# Patient Record
Sex: MALE | Race: ASIAN | NOT HISPANIC OR LATINO
[De-identification: names, ages, dates, MRNs, and addresses within clinical notes are randomized per-mention and may not be internally consistent; named-entity substitution may affect disease eponyms.]

---

## 2020-04-03 PROBLEM — Z00.00 ENCOUNTER FOR PREVENTIVE HEALTH EXAMINATION: Status: ACTIVE | Noted: 2020-04-03

## 2020-04-10 ENCOUNTER — APPOINTMENT (OUTPATIENT)
Dept: NEPHROLOGY | Facility: CLINIC | Age: 85
End: 2020-04-10

## 2020-05-21 ENCOUNTER — APPOINTMENT (OUTPATIENT)
Dept: NEUROLOGY | Facility: CLINIC | Age: 85
End: 2020-05-21
Payer: MEDICAID

## 2020-05-21 DIAGNOSIS — Z85.79 PERSONAL HISTORY OF OTHER MALIGNANT NEOPLASMS OF LYMPHOID, HEMATOPOIETIC AND RELATED TISSUES: ICD-10-CM

## 2020-05-21 DIAGNOSIS — Z86.79 PERSONAL HISTORY OF OTHER DISEASES OF THE CIRCULATORY SYSTEM: ICD-10-CM

## 2020-05-21 DIAGNOSIS — Z86.69 PERSONAL HISTORY OF OTHER DISEASES OF THE NERVOUS SYSTEM AND SENSE ORGANS: ICD-10-CM

## 2020-05-21 PROCEDURE — 99203 OFFICE O/P NEW LOW 30 MIN: CPT | Mod: 95

## 2020-05-21 RX ORDER — AMLODIPINE BESYLATE 10 MG/1
10 TABLET ORAL
Refills: 0 | Status: ACTIVE | COMMUNITY

## 2020-05-21 NOTE — CONSULT LETTER
[Dear  ___] : Dear  [unfilled], [Courtesy Letter:] : I had the pleasure of seeing your patient, [unfilled], in my office today. [Consult Closing:] : Thank you very much for allowing me to participate in the care of this patient.  If you have any questions, please do not hesitate to contact me. [Please see my note below.] : Please see my note below. [Sincerely,] : Sincerely, [FreeTextEntry3] : Daron Hutchinson MD.

## 2020-05-21 NOTE — ASSESSMENT
[FreeTextEntry1] : This is a 89-year-old man with a history of lymphoma for which she underwent chemotherapy.\par He now has symptoms of peripheral neuropathy as well as tremor.\par \par I would like to obtain a dopamine spectroscopy scan to assess for Parkinson's disease.\par \par I would also like to obtain EMG and nerve conduction studies to assess the degree of peripheral neuropathy.\par \par I advised the patient and his son to contact me with any questions or concerns.\par I further explained that a followup visit will be arranged once the current global crisis has abated.

## 2020-05-21 NOTE — PHYSICAL EXAM
[FreeTextEntry1] : Examination:\par Examination is limited due to the nature of tele health visit. \par \par The patient is well appearing and in no acute distress. \par \par Neurological Examination: \par Mental status: The patient is awake, alert, and oriented. Attention span seems normal. Recent and remote memory seem intact. \par Cranial nerves: Extraocular motion is full. Face appears symmetric, Shoulder shrug is symmetric, tongue is midline. \par Motor: There is no pronator drift. Fine finger movement is intact. There was no tremor visible via the video connection.\par Sensory: Unable to test.\par Reflexes: Unable to test. \par Cerebellar: No finger nose dysmetria.\par \par Gait is broad based (within the confines of the tele health technology). \par \par There is no edema seen. \par

## 2020-05-21 NOTE — HISTORY OF PRESENT ILLNESS
[Home] : at home, [unfilled] , at the time of the visit. [Verbal consent obtained from patient] : the patient, [unfilled] [Medical Office: ([facility])___] : at the medical office located in  [Family Member] : family member [FreeTextEntry1] : This patient had a tele health visit today. \par \par He has had tremors of both hands.\par He also has noticed decreased sensitivity in the feet.\par He has difficulty with balance.\par The symptoms began in late 2018.\par He has a history of lymphoma and had been on chemotherapy.\par \par He denies neuropathic pain.\par \par

## 2020-06-03 ENCOUNTER — APPOINTMENT (OUTPATIENT)
Dept: NUCLEAR MEDICINE | Facility: CLINIC | Age: 85
End: 2020-06-03
Payer: MEDICAID

## 2020-06-03 ENCOUNTER — OUTPATIENT (OUTPATIENT)
Dept: OUTPATIENT SERVICES | Facility: HOSPITAL | Age: 85
LOS: 1 days | End: 2020-06-03

## 2020-06-03 DIAGNOSIS — R25.1 TREMOR, UNSPECIFIED: ICD-10-CM

## 2020-06-03 PROCEDURE — 78803 RP LOCLZJ TUM SPECT 1 AREA: CPT | Mod: 26

## 2020-06-16 ENCOUNTER — APPOINTMENT (OUTPATIENT)
Dept: NEPHROLOGY | Facility: CLINIC | Age: 85
End: 2020-06-16

## 2020-06-17 ENCOUNTER — APPOINTMENT (OUTPATIENT)
Dept: NEUROLOGY | Facility: CLINIC | Age: 85
End: 2020-06-17

## 2020-06-28 LAB — SARS-COV-2 N GENE NPH QL NAA+PROBE: NOT DETECTED

## 2020-06-29 ENCOUNTER — APPOINTMENT (OUTPATIENT)
Dept: NEUROLOGY | Facility: CLINIC | Age: 85
End: 2020-06-29
Payer: MEDICAID

## 2020-06-29 PROCEDURE — 95886 MUSC TEST DONE W/N TEST COMP: CPT

## 2020-06-29 PROCEDURE — 95910 NRV CNDJ TEST 7-8 STUDIES: CPT

## 2020-08-28 ENCOUNTER — APPOINTMENT (OUTPATIENT)
Dept: NEUROLOGY | Facility: CLINIC | Age: 85
End: 2020-08-28
Payer: MEDICAID

## 2020-08-28 VITALS — DIASTOLIC BLOOD PRESSURE: 80 MMHG | TEMPERATURE: 98.7 F | HEIGHT: 64 IN | SYSTOLIC BLOOD PRESSURE: 140 MMHG

## 2020-08-28 PROCEDURE — 99213 OFFICE O/P EST LOW 20 MIN: CPT

## 2020-08-28 NOTE — HISTORY OF PRESENT ILLNESS
[FreeTextEntry1] : I saw this patient in the office today.\par \par As you recall, he has had tremors of both hands.\par He also has noticed decreased sensitivity in the feet.\par He has difficulty with balance.\par The symptoms began in late 2018.\par He has a history of lymphoma and had been on chemotherapy.\par \par He denies neuropathic pain.\par \par

## 2020-08-28 NOTE — PHYSICAL EXAM
[General Appearance - Alert] : alert [Oriented To Time, Place, And Person] : oriented to person, place, and time [General Appearance - In No Acute Distress] : in no acute distress [Memory Remote] : remote memory was not impaired [Affect] : the affect was normal [Memory Recent] : recent memory was not impaired [Cranial Nerves Optic (II)] : visual acuity intact bilaterally,  visual fields full to confrontation, pupils equal round and reactive to light [Cranial Nerves Oculomotor (III)] : extraocular motion intact [Cranial Nerves Facial (VII)] : face symmetrical [Cranial Nerves Vestibulocochlear (VIII)] : hearing was intact bilaterally [Cranial Nerves Trigeminal (V)] : facial sensation intact symmetrically [Cranial Nerves Accessory (XI - Cranial And Spinal)] : head turning and shoulder shrug symmetric [Cranial Nerves Glossopharyngeal (IX)] : tongue and palate midline [Cranial Nerves Hypoglossal (XII)] : there was no tongue deviation with protrusion [Motor Tone] : muscle tone was normal in all four extremities [Sensation Tactile Decrease] : light touch was intact [Motor Strength] : muscle strength was normal in all four extremities [Sensation Pain / Temperature Decrease] : pain and temperature was intact [Sensation Vibration Decrease] : vibration was intact [Limited Balance] : the patient's balance was impaired [Tremor] : a tremor present [1+] : Patella left 1+ [Optic Disc Abnormality] : the optic disc were normal in size and color [Edema] : there was no peripheral edema [Dysarthria] : no dysarthria [Aphasia] : no dysphasia/aphasia [Coordination - Dysmetria Impaired Finger-to-Nose Bilateral] : not present [Plantar Reflex Right Only] : normal on the right [Plantar Reflex Left Only] : normal on the left [FreeTextEntry8] : Ambulation required a cane.

## 2020-08-28 NOTE — DATA REVIEWED
[de-identified] : Marcia scan was normal.  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

## 2020-08-28 NOTE — ASSESSMENT
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

## 2020-11-27 ENCOUNTER — APPOINTMENT (OUTPATIENT)
Dept: NEPHROLOGY | Facility: CLINIC | Age: 85
End: 2020-11-27
Payer: MEDICAID

## 2020-11-27 VITALS
BODY MASS INDEX: 27.83 KG/M2 | TEMPERATURE: 98.5 F | SYSTOLIC BLOOD PRESSURE: 145 MMHG | HEART RATE: 69 BPM | HEIGHT: 64 IN | DIASTOLIC BLOOD PRESSURE: 74 MMHG | OXYGEN SATURATION: 97 % | RESPIRATION RATE: 14 BRPM | WEIGHT: 163 LBS

## 2020-11-27 DIAGNOSIS — D69.6 THROMBOCYTOPENIA, UNSPECIFIED: ICD-10-CM

## 2020-11-27 DIAGNOSIS — R79.0 ABNORMAL LVL OF BLOOD MINERAL: ICD-10-CM

## 2020-11-27 DIAGNOSIS — N18.4 CHRONIC KIDNEY DISEASE, STAGE 4 (SEVERE): ICD-10-CM

## 2020-11-27 DIAGNOSIS — N18.9 CHRONIC KIDNEY DISEASE, UNSPECIFIED: ICD-10-CM

## 2020-11-27 DIAGNOSIS — D63.1 CHRONIC KIDNEY DISEASE, UNSPECIFIED: ICD-10-CM

## 2020-11-27 PROCEDURE — 36415 COLL VENOUS BLD VENIPUNCTURE: CPT

## 2020-11-27 PROCEDURE — 99215 OFFICE O/P EST HI 40 MIN: CPT | Mod: 25

## 2020-11-27 RX ORDER — BRIMONIDINE TARTRATE 1 MG/ML
SOLUTION/ DROPS OPHTHALMIC
Refills: 0 | Status: DISCONTINUED | COMMUNITY
End: 2020-11-27

## 2020-11-27 RX ORDER — FOLIC ACID 1 MG/1
1 TABLET ORAL
Qty: 30 | Refills: 0 | Status: ACTIVE | COMMUNITY
Start: 2020-11-11

## 2020-11-27 RX ORDER — POLYETHYLENE GLYCOL 3350 17 G/17G
17 POWDER, FOR SOLUTION ORAL
Qty: 510 | Refills: 0 | Status: DISCONTINUED | COMMUNITY
Start: 2020-09-25 | End: 2020-11-27

## 2020-11-27 RX ORDER — LATANOPROST/PF 0.005 %
0.01 DROPS OPHTHALMIC (EYE)
Refills: 0 | Status: DISCONTINUED | COMMUNITY
End: 2020-11-27

## 2020-11-27 NOTE — PHYSICAL EXAM
[General Appearance - Alert] : alert [General Appearance - In No Acute Distress] : in no acute distress [Sclera] : the sclera and conjunctiva were normal [PERRL With Normal Accommodation] : pupils were equal in size, round, and reactive to light [Extraocular Movements] : extraocular movements were intact [Outer Ear] : the ears and nose were normal in appearance [Oropharynx] : the oropharynx was normal [Neck Appearance] : the appearance of the neck was normal [Neck Cervical Mass (___cm)] : no neck mass was observed [Jugular Venous Distention Increased] : there was no jugular-venous distention [Thyroid Diffuse Enlargement] : the thyroid was not enlarged [Thyroid Nodule] : there were no palpable thyroid nodules [Auscultation Breath Sounds / Voice Sounds] : lungs were clear to auscultation bilaterally [Heart Rate And Rhythm] : heart rate was normal and rhythm regular [Heart Sounds] : normal S1 and S2 [Heart Sounds Gallop] : no gallops [Murmurs] : no murmurs [Heart Sounds Pericardial Friction Rub] : no pericardial rub [Full Pulse] : the pedal pulses are present [Edema] : there was no peripheral edema [Bowel Sounds] : normal bowel sounds [Abdomen Soft] : soft [Abdomen Tenderness] : non-tender [Abdomen Mass (___ Cm)] : no abdominal mass palpated [Cervical Lymph Nodes Enlarged Posterior Bilaterally] : posterior cervical [Cervical Lymph Nodes Enlarged Anterior Bilaterally] : anterior cervical [Supraclavicular Lymph Nodes Enlarged Bilaterally] : supraclavicular [Axillary Lymph Nodes Enlarged Bilaterally] : axillary [Femoral Lymph Nodes Enlarged Bilaterally] : femoral [Inguinal Lymph Nodes Enlarged Bilaterally] : inguinal [Skin Color & Pigmentation] : normal skin color and pigmentation [Skin Turgor] : normal skin turgor [] : no rash [Deep Tendon Reflexes (DTR)] : deep tendon reflexes were 2+ and symmetric [Sensation] : the sensory exam was normal to light touch and pinprick [No Focal Deficits] : no focal deficits [Oriented To Time, Place, And Person] : oriented to person, place, and time [Impaired Insight] : insight and judgment were intact [Affect] : the affect was normal

## 2020-11-27 NOTE — ASSESSMENT
[FreeTextEntry1] : DX : Lymphoma- Treated w. Chemotherapy,\par \par CKD - 4, Anemia,\par \par Peripheral Neuropathy, Tremor, \par \par W.U

## 2020-11-27 NOTE — HISTORY OF PRESENT ILLNESS
[FreeTextEntry1] : Neuropathy (355.9) (G62.9)\par \par  Tremor (781.0) (R25.1)  \par \par This is a 89 year-old man with a history of lymphoma for which he  underwent chemotherapy. He now has symptoms of peripheral neuropathy as well as tremor.  Marcia scan was normal.  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

## 2020-11-28 LAB
25(OH)D3 SERPL-MCNC: 29.6 NG/ML
ALBUMIN SERPL ELPH-MCNC: 4.2 G/DL
ANION GAP SERPL CALC-SCNC: 11 MMOL/L
BASOPHILS # BLD AUTO: 0.02 K/UL
BASOPHILS NFR BLD AUTO: 0.3 %
BUN SERPL-MCNC: 44 MG/DL
CALCIUM SERPL-MCNC: 9.4 MG/DL
CALCIUM SERPL-MCNC: 9.4 MG/DL
CHLORIDE SERPL-SCNC: 103 MMOL/L
CO2 SERPL-SCNC: 26 MMOL/L
CREAT SERPL-MCNC: 2.82 MG/DL
EOSINOPHIL # BLD AUTO: 0.17 K/UL
EOSINOPHIL NFR BLD AUTO: 2.8 %
GLUCOSE SERPL-MCNC: 127 MG/DL
HCT VFR BLD CALC: 34.1 %
HGB BLD-MCNC: 10.9 G/DL
IMM GRANULOCYTES NFR BLD AUTO: 0.2 %
LYMPHOCYTES # BLD AUTO: 3.83 K/UL
LYMPHOCYTES NFR BLD AUTO: 62.7 %
MAN DIFF?: NORMAL
MCHC RBC-ENTMCNC: 32 GM/DL
MCHC RBC-ENTMCNC: 33 PG
MCV RBC AUTO: 103.3 FL
MONOCYTES # BLD AUTO: 0.55 K/UL
MONOCYTES NFR BLD AUTO: 9 %
NEUTROPHILS # BLD AUTO: 1.53 K/UL
NEUTROPHILS NFR BLD AUTO: 25 %
PARATHYROID HORMONE INTACT: 62 PG/ML
PHOSPHATE SERPL-MCNC: 3.3 MG/DL
PLATELET # BLD AUTO: 116 K/UL
POTASSIUM SERPL-SCNC: 3.8 MMOL/L
RBC # BLD: 3.3 M/UL
RBC # FLD: 13.6 %
SODIUM SERPL-SCNC: 140 MMOL/L
WBC # FLD AUTO: 6.11 K/UL

## 2020-12-03 LAB
APPEARANCE: CLEAR
BACTERIA: NEGATIVE
BILIRUBIN URINE: NEGATIVE
BLOOD URINE: NEGATIVE
COLOR: NORMAL
CREAT SPEC-SCNC: 107 MG/DL
CREAT/PROT UR: 0.4 RATIO
GLUCOSE QUALITATIVE U: NEGATIVE
HYALINE CASTS: 1 /LPF
KETONES URINE: NEGATIVE
LEUKOCYTE ESTERASE URINE: NEGATIVE
MICROSCOPIC-UA: NORMAL
NITRITE URINE: NEGATIVE
PH URINE: 6
PROT UR-MCNC: 44 MG/DL
PROTEIN URINE: ABNORMAL
RED BLOOD CELLS URINE: 1 /HPF
SPECIFIC GRAVITY URINE: 1.02
SQUAMOUS EPITHELIAL CELLS: 1 /HPF
UROBILINOGEN URINE: NORMAL
WHITE BLOOD CELLS URINE: 1 /HPF

## 2020-12-08 ENCOUNTER — OUTPATIENT (OUTPATIENT)
Dept: OUTPATIENT SERVICES | Facility: HOSPITAL | Age: 85
LOS: 1 days | End: 2020-12-08

## 2020-12-08 ENCOUNTER — APPOINTMENT (OUTPATIENT)
Dept: ULTRASOUND IMAGING | Facility: CLINIC | Age: 85
End: 2020-12-08
Payer: MEDICAID

## 2020-12-08 DIAGNOSIS — N18.4 CHRONIC KIDNEY DISEASE, STAGE 4 (SEVERE): ICD-10-CM

## 2020-12-08 PROCEDURE — 76770 US EXAM ABDO BACK WALL COMP: CPT | Mod: 26

## 2020-12-18 ENCOUNTER — APPOINTMENT (OUTPATIENT)
Dept: NEPHROLOGY | Facility: CLINIC | Age: 85
End: 2020-12-18

## 2021-02-26 ENCOUNTER — APPOINTMENT (OUTPATIENT)
Dept: NEUROLOGY | Facility: CLINIC | Age: 86
End: 2021-02-26
Payer: MEDICAID

## 2021-02-26 VITALS — DIASTOLIC BLOOD PRESSURE: 60 MMHG | TEMPERATURE: 97.4 F | HEIGHT: 64 IN | SYSTOLIC BLOOD PRESSURE: 110 MMHG

## 2021-02-26 PROCEDURE — 99213 OFFICE O/P EST LOW 20 MIN: CPT

## 2021-02-26 NOTE — CONSULT LETTER
[Dear  ___] : Dear  [unfilled], [Courtesy Letter:] : I had the pleasure of seeing your patient, [unfilled], in my office today. [Please see my note below.] : Please see my note below. [Consult Closing:] : Thank you very much for allowing me to participate in the care of this patient.  If you have any questions, please do not hesitate to contact me. [Sincerely,] : Sincerely, [DrDarien  ___] : Dr. GAYLE [FreeTextEntry3] : Daron Hutchinson MD.

## 2021-02-26 NOTE — DATA REVIEWED
[de-identified] : Marcia scan was normal.  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

## 2021-02-26 NOTE — ASSESSMENT
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

## 2021-02-26 NOTE — PHYSICAL EXAM
[General Appearance - Alert] : alert [General Appearance - In No Acute Distress] : in no acute distress [Oriented To Time, Place, And Person] : oriented to person, place, and time [Affect] : the affect was normal [Memory Recent] : recent memory was not impaired [Memory Remote] : remote memory was not impaired [Cranial Nerves Optic (II)] : visual acuity intact bilaterally,  visual fields full to confrontation, pupils equal round and reactive to light [Cranial Nerves Oculomotor (III)] : extraocular motion intact [Cranial Nerves Trigeminal (V)] : facial sensation intact symmetrically [Cranial Nerves Facial (VII)] : face symmetrical [Cranial Nerves Vestibulocochlear (VIII)] : hearing was intact bilaterally [Cranial Nerves Glossopharyngeal (IX)] : tongue and palate midline [Cranial Nerves Accessory (XI - Cranial And Spinal)] : head turning and shoulder shrug symmetric [Cranial Nerves Hypoglossal (XII)] : there was no tongue deviation with protrusion [Motor Tone] : muscle tone was normal in all four extremities [Motor Strength] : muscle strength was normal in all four extremities [Sensation Tactile Decrease] : light touch was intact [Sensation Pain / Temperature Decrease] : pain and temperature was intact [Sensation Vibration Decrease] : vibration was intact [Limited Balance] : the patient's balance was impaired [Tremor] : a tremor present [1+] : Patella left 1+ [Optic Disc Abnormality] : the optic disc were normal in size and color [Edema] : there was no peripheral edema [Dysarthria] : no dysarthria [Aphasia] : no dysphasia/aphasia [Coordination - Dysmetria Impaired Finger-to-Nose Bilateral] : not present [Plantar Reflex Right Only] : normal on the right [Plantar Reflex Left Only] : normal on the left [FreeTextEntry8] : Ambulation required a cane.

## 2021-07-10 ENCOUNTER — EMERGENCY (EMERGENCY)
Facility: HOSPITAL | Age: 86
LOS: 1 days | Discharge: HOME | End: 2021-07-10
Attending: EMERGENCY MEDICINE
Payer: MEDICAID

## 2021-07-10 VITALS
RESPIRATION RATE: 18 BRPM | TEMPERATURE: 99 F | SYSTOLIC BLOOD PRESSURE: 155 MMHG | OXYGEN SATURATION: 98 % | DIASTOLIC BLOOD PRESSURE: 84 MMHG | HEART RATE: 87 BPM | HEIGHT: 65 IN | WEIGHT: 160.28 LBS

## 2021-07-10 VITALS
SYSTOLIC BLOOD PRESSURE: 147 MMHG | TEMPERATURE: 98 F | OXYGEN SATURATION: 98 % | HEART RATE: 82 BPM | DIASTOLIC BLOOD PRESSURE: 74 MMHG | RESPIRATION RATE: 18 BRPM

## 2021-07-10 LAB
ALBUMIN SERPL ELPH-MCNC: 4.2 G/DL — SIGNIFICANT CHANGE UP (ref 3.3–5.2)
ALP SERPL-CCNC: 90 U/L — SIGNIFICANT CHANGE UP (ref 40–120)
ALT FLD-CCNC: 16 U/L — SIGNIFICANT CHANGE UP
ANION GAP SERPL CALC-SCNC: 13 MMOL/L — SIGNIFICANT CHANGE UP (ref 5–17)
APPEARANCE UR: CLEAR — SIGNIFICANT CHANGE UP
AST SERPL-CCNC: 27 U/L — SIGNIFICANT CHANGE UP
BACTERIA # UR AUTO: NEGATIVE — SIGNIFICANT CHANGE UP
BASOPHILS # BLD AUTO: 0.01 K/UL — SIGNIFICANT CHANGE UP (ref 0–0.2)
BASOPHILS NFR BLD AUTO: 0.2 % — SIGNIFICANT CHANGE UP (ref 0–2)
BILIRUB SERPL-MCNC: 0.3 MG/DL — LOW (ref 0.4–2)
BILIRUB UR-MCNC: NEGATIVE — SIGNIFICANT CHANGE UP
BUN SERPL-MCNC: 39.5 MG/DL — HIGH (ref 8–20)
CALCIUM SERPL-MCNC: 9.2 MG/DL — SIGNIFICANT CHANGE UP (ref 8.6–10.2)
CHLORIDE SERPL-SCNC: 100 MMOL/L — SIGNIFICANT CHANGE UP (ref 98–107)
CO2 SERPL-SCNC: 23 MMOL/L — SIGNIFICANT CHANGE UP (ref 22–29)
COLOR SPEC: YELLOW — SIGNIFICANT CHANGE UP
CREAT SERPL-MCNC: 2.86 MG/DL — HIGH (ref 0.5–1.3)
DIFF PNL FLD: ABNORMAL
EOSINOPHIL # BLD AUTO: 0 K/UL — SIGNIFICANT CHANGE UP (ref 0–0.5)
EOSINOPHIL NFR BLD AUTO: 0 % — SIGNIFICANT CHANGE UP (ref 0–6)
EPI CELLS # UR: NEGATIVE — SIGNIFICANT CHANGE UP
GLUCOSE SERPL-MCNC: 141 MG/DL — HIGH (ref 70–99)
GLUCOSE UR QL: NEGATIVE MG/DL — SIGNIFICANT CHANGE UP
HCT VFR BLD CALC: 34.7 % — LOW (ref 39–50)
HGB BLD-MCNC: 11.7 G/DL — LOW (ref 13–17)
IMM GRANULOCYTES NFR BLD AUTO: 0.4 % — SIGNIFICANT CHANGE UP (ref 0–1.5)
KETONES UR-MCNC: NEGATIVE — SIGNIFICANT CHANGE UP
LEUKOCYTE ESTERASE UR-ACNC: NEGATIVE — SIGNIFICANT CHANGE UP
LYMPHOCYTES # BLD AUTO: 0.98 K/UL — LOW (ref 1–3.3)
LYMPHOCYTES # BLD AUTO: 17.4 % — SIGNIFICANT CHANGE UP (ref 13–44)
MCHC RBC-ENTMCNC: 33.7 GM/DL — SIGNIFICANT CHANGE UP (ref 32–36)
MCHC RBC-ENTMCNC: 34.4 PG — HIGH (ref 27–34)
MCV RBC AUTO: 102.1 FL — HIGH (ref 80–100)
MONOCYTES # BLD AUTO: 0.66 K/UL — SIGNIFICANT CHANGE UP (ref 0–0.9)
MONOCYTES NFR BLD AUTO: 11.7 % — SIGNIFICANT CHANGE UP (ref 2–14)
NEUTROPHILS # BLD AUTO: 3.96 K/UL — SIGNIFICANT CHANGE UP (ref 1.8–7.4)
NEUTROPHILS NFR BLD AUTO: 70.3 % — SIGNIFICANT CHANGE UP (ref 43–77)
NITRITE UR-MCNC: NEGATIVE — SIGNIFICANT CHANGE UP
PH UR: 6 — SIGNIFICANT CHANGE UP (ref 5–8)
PLATELET # BLD AUTO: 109 K/UL — LOW (ref 150–400)
POTASSIUM SERPL-MCNC: 4.5 MMOL/L — SIGNIFICANT CHANGE UP (ref 3.5–5.3)
POTASSIUM SERPL-SCNC: 4.5 MMOL/L — SIGNIFICANT CHANGE UP (ref 3.5–5.3)
PROT SERPL-MCNC: 8.3 G/DL — SIGNIFICANT CHANGE UP (ref 6.6–8.7)
PROT UR-MCNC: 30 MG/DL
RBC # BLD: 3.4 M/UL — LOW (ref 4.2–5.8)
RBC # FLD: 12 % — SIGNIFICANT CHANGE UP (ref 10.3–14.5)
RBC CASTS # UR COMP ASSIST: ABNORMAL /HPF (ref 0–4)
SODIUM SERPL-SCNC: 136 MMOL/L — SIGNIFICANT CHANGE UP (ref 135–145)
SP GR SPEC: 1.01 — SIGNIFICANT CHANGE UP (ref 1.01–1.02)
UROBILINOGEN FLD QL: NEGATIVE MG/DL — SIGNIFICANT CHANGE UP
WBC # BLD: 5.63 K/UL — SIGNIFICANT CHANGE UP (ref 3.8–10.5)
WBC # FLD AUTO: 5.63 K/UL — SIGNIFICANT CHANGE UP (ref 3.8–10.5)
WBC UR QL: SIGNIFICANT CHANGE UP

## 2021-07-10 PROCEDURE — 74176 CT ABD & PELVIS W/O CONTRAST: CPT

## 2021-07-10 PROCEDURE — 51702 INSERT TEMP BLADDER CATH: CPT

## 2021-07-10 PROCEDURE — 85025 COMPLETE CBC W/AUTO DIFF WBC: CPT

## 2021-07-10 PROCEDURE — 36415 COLL VENOUS BLD VENIPUNCTURE: CPT

## 2021-07-10 PROCEDURE — 93010 ELECTROCARDIOGRAM REPORT: CPT

## 2021-07-10 PROCEDURE — 93005 ELECTROCARDIOGRAM TRACING: CPT

## 2021-07-10 PROCEDURE — 99285 EMERGENCY DEPT VISIT HI MDM: CPT

## 2021-07-10 PROCEDURE — 87086 URINE CULTURE/COLONY COUNT: CPT

## 2021-07-10 PROCEDURE — 74176 CT ABD & PELVIS W/O CONTRAST: CPT | Mod: 26,MA

## 2021-07-10 PROCEDURE — 99284 EMERGENCY DEPT VISIT MOD MDM: CPT | Mod: 25

## 2021-07-10 PROCEDURE — 80053 COMPREHEN METABOLIC PANEL: CPT

## 2021-07-10 PROCEDURE — 81001 URINALYSIS AUTO W/SCOPE: CPT

## 2021-07-10 RX ORDER — ACETAMINOPHEN 500 MG
650 TABLET ORAL ONCE
Refills: 0 | Status: COMPLETED | OUTPATIENT
Start: 2021-07-10 | End: 2021-07-10

## 2021-07-10 RX ADMIN — Medication 650 MILLIGRAM(S): at 10:06

## 2021-07-10 NOTE — ED ADULT TRIAGE NOTE - CHIEF COMPLAINT QUOTE
pt A&O x4 from home c/o constipation x 6 days and urinary retention since 6pm last night. Pt developed lower abdominal pain this AM. Denies any other symptoms

## 2021-07-10 NOTE — ED PROVIDER NOTE - CLINICAL SUMMARY MEDICAL DECISION MAKING FREE TEXT BOX
Pt is a 89 y/o M c/o urinary retention, will get bladder scan, ct abd/pelv, labs, ua, ekg, rectal temp

## 2021-07-10 NOTE — ED PROVIDER NOTE - PHYSICAL EXAMINATION
General: well appearing, interactive, well nourished, NAD  HEENT: pupils equal and reactive, normal external ears bilaterally   Cardiac: RRR, no MRG appreciated  Resp: lungs clear to auscultation bilaterally, symmetric chest wall rise  Abd: suprapubic firm and tender, nondistended,   : no CVA tenderness  Neuro: Moving all extremities  Skin:  normal color for race

## 2021-07-10 NOTE — ED PROVIDER NOTE - PATIENT PORTAL LINK FT
You can access the FollowMyHealth Patient Portal offered by [facility] by registering at the following website: [email or web address]. By joining [facility]’s FollowMyHealth portal, you will also be able to view your health information using other applications (apps) compatible with our system.

## 2021-07-10 NOTE — ED PROVIDER NOTE - OBJECTIVE STATEMENT
Pt is a 89 y/o M w/PMHx HTN, non-Hodgkin's lymphoma (remission) presents c/o urinary retention.  Per son at bedside, Pt has not urinated since last night around 6 pm, and states that when he tries to urinate he has a burning sensation.  Pt has been taking go-lytely for the past few days due to constipation, and has had issues with constipation previously.  He has been passing gas, but very little stool.  Pt endorses abdominal pain, dysuria, constipation, denies chest pain, shortness of breath, fever/chills.

## 2021-07-10 NOTE — ED PROVIDER NOTE - NS ED ROS FT
CONSTITUTIONAL: No fevers, no chills  Eyes: No vision changes  Cardiovascular: No Chest pain  Respiratory: No SOB  Gastrointestinal: No n/v/d, +constipation, +abd pain  Genitourinary: +dysuria, no hematuria  SKIN: no rashes.  MSK: no weakness, no myalgias, no arthralgias  NEURO: no headache, no weakness, no numbness  PSYCHIATRIC: no SI/HI

## 2021-07-10 NOTE — ED PROVIDER NOTE - CARE PROVIDER_API CALL
Spencer Moise  UROLOGY  [address] [address] [place] [address]  Phone: [phone]  Fax: [phone]  Follow Up Time:     Santos Valverde)  Gastroenterology; Internal Medicine  [address] [place] [address], [place] [address]  Phone: [phone]  Fax: [phone]  Follow Up Time:

## 2021-07-10 NOTE — ED PROVIDER NOTE - CARE PROVIDERS DIRECT ADDRESSES
[email or web address]

## 2021-07-10 NOTE — ED PROVIDER NOTE - ATTENDING CONTRIBUTION TO CARE
I, Maik Reno, personally saw the patient with the resident, and completed the key components of the history and physical exam. I then discussed the management plan with the resident.    89 yo M hx of HTN, ckd, non-hodgkin lymphoma, chronic constipation brought in by son for urinary retention, burning sensation, and constipation. patient is passing gas. no fever. no nausea or vomiting. patient has suprapubic tenderness. bladder scan showed > 490 cc. gutierrez placed. CT abdomen showed sterocolar proctitis. wbc 5.63. creatine 2.86 at baseline. attempted manual disimpaction with small amount of stool. recommend rectal enema. GI and urology follow up.

## 2021-07-11 LAB
CULTURE RESULTS: NO GROWTH — SIGNIFICANT CHANGE UP
SPECIMEN SOURCE: SIGNIFICANT CHANGE UP

## 2021-07-23 ENCOUNTER — APPOINTMENT (OUTPATIENT)
Dept: UROLOGY | Facility: CLINIC | Age: 86
End: 2021-07-23

## 2021-08-27 ENCOUNTER — APPOINTMENT (OUTPATIENT)
Dept: NEUROLOGY | Facility: CLINIC | Age: 86
End: 2021-08-27
Payer: MEDICAID

## 2021-08-27 ENCOUNTER — NON-APPOINTMENT (OUTPATIENT)
Age: 86
End: 2021-08-27

## 2021-08-27 PROCEDURE — 99213 OFFICE O/P EST LOW 20 MIN: CPT

## 2021-08-27 NOTE — DATA REVIEWED
[de-identified] : Marcia scan was normal.  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

## 2022-03-09 ENCOUNTER — APPOINTMENT (OUTPATIENT)
Dept: NEUROLOGY | Facility: CLINIC | Age: 87
End: 2022-03-09

## 2022-12-08 ENCOUNTER — OFFICE (OUTPATIENT)
Dept: URBAN - METROPOLITAN AREA CLINIC 115 | Facility: CLINIC | Age: 87
Setting detail: OPHTHALMOLOGY
End: 2022-12-08
Payer: MEDICAID

## 2022-12-08 DIAGNOSIS — H34.8322: ICD-10-CM

## 2022-12-08 DIAGNOSIS — H35.3131: ICD-10-CM

## 2022-12-08 DIAGNOSIS — H34.8310: ICD-10-CM

## 2022-12-08 DIAGNOSIS — H35.373: ICD-10-CM

## 2022-12-08 PROCEDURE — 92134 CPTRZ OPH DX IMG PST SGM RTA: CPT | Performed by: OPHTHALMOLOGY

## 2022-12-08 PROCEDURE — 67210 TREATMENT OF RETINAL LESION: CPT | Performed by: OPHTHALMOLOGY

## 2022-12-08 ASSESSMENT — KERATOMETRY
OD_K1POWER_DIOPTERS: 43.75
OD_K2POWER_DIOPTERS: 6.50
OS_K2POWER_DIOPTERS: 46.00
OS_AXISANGLE_DEGREES: 010
METHOD_AUTO_MANUAL: AUTO
OD_AXISANGLE_DEGREES: 172
OS_K1POWER_DIOPTERS: 42.75

## 2022-12-08 ASSESSMENT — REFRACTION_AUTOREFRACTION
OD_CYLINDER: -0.50
OD_AXIS: 089
OS_AXIS: 106
OS_CYLINDER: -2.00
OD_SPHERE: -0.25
OS_SPHERE: +1.00

## 2022-12-08 ASSESSMENT — VISUAL ACUITY
OD_BCVA: 20/70-1
OS_BCVA: 20/50

## 2022-12-08 ASSESSMENT — SPHEQUIV_DERIVED
OD_SPHEQUIV: -0.5
OS_SPHEQUIV: 0

## 2022-12-08 ASSESSMENT — TONOMETRY
OS_IOP_MMHG: 13
OD_IOP_MMHG: 11

## 2022-12-08 ASSESSMENT — AXIALLENGTH_DERIVED
OS_AL: 23.2748
OD_AL: 33.43

## 2022-12-08 ASSESSMENT — CONFRONTATIONAL VISUAL FIELD TEST (CVF)
OD_FINDINGS: FULL
OS_FINDINGS: FULL

## 2023-03-10 ENCOUNTER — OFFICE (OUTPATIENT)
Dept: URBAN - METROPOLITAN AREA CLINIC 6 | Facility: CLINIC | Age: 88
Setting detail: OPHTHALMOLOGY
End: 2023-03-10
Payer: MEDICAID

## 2023-03-10 DIAGNOSIS — H40.1132: ICD-10-CM

## 2023-03-10 DIAGNOSIS — H02.403: ICD-10-CM

## 2023-03-10 DIAGNOSIS — H34.8330: ICD-10-CM

## 2023-03-10 DIAGNOSIS — Z96.1: ICD-10-CM

## 2023-03-10 DIAGNOSIS — H35.373: ICD-10-CM

## 2023-03-10 DIAGNOSIS — H35.3131: ICD-10-CM

## 2023-03-10 DIAGNOSIS — H26.493: ICD-10-CM

## 2023-03-10 PROBLEM — H35.363 DRUSEN; BOTH EYES: Status: ACTIVE | Noted: 2023-03-10

## 2023-03-10 PROCEDURE — 92014 COMPRE OPH EXAM EST PT 1/>: CPT | Performed by: OPHTHALMOLOGY

## 2023-03-10 ASSESSMENT — CONFRONTATIONAL VISUAL FIELD TEST (CVF)
OD_FINDINGS: FULL
OS_FINDINGS: FULL

## 2023-03-10 ASSESSMENT — REFRACTION_MANIFEST
OD_VA1: 20/50
OS_CYLINDER: -2.25
OD_CYLINDER: -1.25
OS_VA1: 20/70
OD_SPHERE: +0.75
OS_AXIS: 095
OS_SPHERE: +1.50
OD_AXIS: 090

## 2023-03-10 ASSESSMENT — LID POSITION - COMMENTS
OD_COMMENTS: LEVATOR DEHISANCE.    MRD 1:  -1   IPD:  4   ULE:  >       BROW PTOSIS    MM,  PROMINENT FOREHEAD RHYTIDS, PROMINENT NASAL FAT PADS BOTH UPPER EYELIDS , NASAL FAT PADS MECHANICALLY WEIGHING DOWN UPPER EYELIDS, PROMINENT NASAL MIDDLE TEMPORAL FAT PADS BOTH
OS_COMMENTS: LEVATOR DEHISANCE.    MRD 1:  -1   IPD:  4   ULE:  >       BROW PTOSIS    MM,  PROMINENT FOREHEAD RHYTIDS, PROMINENT NASAL FAT PADS BOTH UPPER EYELIDS , NASAL FAT PADS MECHANICALLY WEIGHING DOWN UPPER EYELIDS, PROMINENT NASAL MIDDLE TEMPORAL FAT PADS BOTH

## 2023-03-10 ASSESSMENT — KERATOMETRY
OD_AXISANGLE_DEGREES: 001
OS_K2POWER_DIOPTERS: 45.75
METHOD_AUTO_MANUAL: AUTO
OD_K2POWER_DIOPTERS: 46.00
OS_AXISANGLE_DEGREES: 004
OS_K1POWER_DIOPTERS: 43.00
OD_K1POWER_DIOPTERS: 43.50

## 2023-03-10 ASSESSMENT — SPHEQUIV_DERIVED
OS_SPHEQUIV: 0.375
OD_SPHEQUIV: 0.125
OD_SPHEQUIV: -0.125
OS_SPHEQUIV: 0.625

## 2023-03-10 ASSESSMENT — REFRACTION_AUTOREFRACTION
OD_AXIS: 088
OS_CYLINDER: -2.25
OS_SPHERE: +1.75
OS_AXIS: 097
OD_CYLINDER: -1.25
OD_SPHERE: +0.50

## 2023-03-10 ASSESSMENT — TONOMETRY
OD_IOP_MMHG: 15
OS_IOP_MMHG: 16

## 2023-03-10 ASSESSMENT — VISUAL ACUITY
OS_BCVA: 20/50
OD_BCVA: 20/80

## 2023-03-10 ASSESSMENT — AXIALLENGTH_DERIVED
OS_AL: 23.1335
OD_AL: 23.1885
OS_AL: 23.0402
OD_AL: 23.0948

## 2023-03-10 ASSESSMENT — LID POSITION - PTOSIS
OS_PTOSIS: LUL 4+
OD_PTOSIS: RUL 4+

## 2023-04-28 ENCOUNTER — APPOINTMENT (OUTPATIENT)
Dept: NEUROLOGY | Facility: CLINIC | Age: 88
End: 2023-04-28
Payer: MEDICAID

## 2023-04-28 VITALS — HEIGHT: 64 IN | SYSTOLIC BLOOD PRESSURE: 138 MMHG | DIASTOLIC BLOOD PRESSURE: 60 MMHG

## 2023-04-28 DIAGNOSIS — R25.1 TREMOR, UNSPECIFIED: ICD-10-CM

## 2023-04-28 DIAGNOSIS — G62.9 POLYNEUROPATHY, UNSPECIFIED: ICD-10-CM

## 2023-04-28 DIAGNOSIS — F03.90 UNSPECIFIED DEMENTIA W/OUT BEHAVIORAL DISTURBANCE: ICD-10-CM

## 2023-04-28 PROCEDURE — 99213 OFFICE O/P EST LOW 20 MIN: CPT

## 2023-04-28 NOTE — DATA REVIEWED
[de-identified] : Marcia scan was normal.  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

## 2023-04-28 NOTE — HISTORY OF PRESENT ILLNESS
[FreeTextEntry1] : I saw this patient in the office today.\par \par As you recall, he has had tremors of both hands.\par He also has noticed decreased sensitivity in the feet.\par He has difficulty with balance.\par The symptoms began in late 2018.\par He has a history of lymphoma and had been on chemotherapy.\par \par He denies neuropathic pain.\par \par 4/28/2023 visit:\par His family reports that he has declined from a cognitive standpoint.\par He has some sleeping difficulties.

## 2023-04-28 NOTE — ASSESSMENT
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

## 2023-04-28 NOTE — PHYSICAL EXAM
[General Appearance - Alert] : alert [General Appearance - In No Acute Distress] : in no acute distress [Oriented To Time, Place, And Person] : oriented to person, place, and time [Affect] : the affect was normal [Memory Recent] : recent memory was not impaired [Memory Remote] : remote memory was not impaired [Cranial Nerves Optic (II)] : visual acuity intact bilaterally,  visual fields full to confrontation, pupils equal round and reactive to light [Cranial Nerves Oculomotor (III)] : extraocular motion intact [Cranial Nerves Trigeminal (V)] : facial sensation intact symmetrically [Cranial Nerves Facial (VII)] : face symmetrical [Cranial Nerves Vestibulocochlear (VIII)] : hearing was intact bilaterally [Cranial Nerves Glossopharyngeal (IX)] : tongue and palate midline [Cranial Nerves Accessory (XI - Cranial And Spinal)] : head turning and shoulder shrug symmetric [Cranial Nerves Hypoglossal (XII)] : there was no tongue deviation with protrusion [Motor Tone] : muscle tone was normal in all four extremities [Motor Strength] : muscle strength was normal in all four extremities [Sensation Tactile Decrease] : light touch was intact [Sensation Pain / Temperature Decrease] : pain and temperature was intact [Sensation Vibration Decrease] : vibration was intact [Limited Balance] : the patient's balance was impaired [Tremor] : a tremor present [1+] : Patella left 1+ [Edema] : there was no peripheral edema [Optic Disc Abnormality] : the optic disc were normal in size and color [Dysarthria] : no dysarthria [Aphasia] : no dysphasia/aphasia [Coordination - Dysmetria Impaired Finger-to-Nose Bilateral] : not present [Plantar Reflex Right Only] : normal on the right [Plantar Reflex Left Only] : normal on the left [FreeTextEntry8] : Ambulation required a cane.

## 2023-09-12 ENCOUNTER — OFFICE (OUTPATIENT)
Dept: URBAN - METROPOLITAN AREA CLINIC 115 | Facility: CLINIC | Age: 88
Setting detail: OPHTHALMOLOGY
End: 2023-09-12
Payer: MEDICAID

## 2023-09-12 DIAGNOSIS — H26.493: ICD-10-CM

## 2023-09-12 DIAGNOSIS — H02.403: ICD-10-CM

## 2023-09-12 DIAGNOSIS — H34.8330: ICD-10-CM

## 2023-09-12 DIAGNOSIS — H35.363: ICD-10-CM

## 2023-09-12 DIAGNOSIS — Z96.1: ICD-10-CM

## 2023-09-12 DIAGNOSIS — H40.1132: ICD-10-CM

## 2023-09-12 DIAGNOSIS — H35.373: ICD-10-CM

## 2023-09-12 DIAGNOSIS — H35.3131: ICD-10-CM

## 2023-09-12 PROCEDURE — 99214 OFFICE O/P EST MOD 30 MIN: CPT | Performed by: OPHTHALMOLOGY

## 2023-09-12 PROCEDURE — 92083 EXTENDED VISUAL FIELD XM: CPT | Performed by: OPHTHALMOLOGY

## 2023-09-12 PROCEDURE — 92250 FUNDUS PHOTOGRAPHY W/I&R: CPT | Performed by: OPHTHALMOLOGY

## 2023-09-12 ASSESSMENT — TONOMETRY
OS_IOP_MMHG: 15
OS_IOP_MMHG: 16
OD_IOP_MMHG: 14
OD_IOP_MMHG: 16

## 2023-09-12 ASSESSMENT — REFRACTION_MANIFEST
OS_AXIS: 095
OD_VA1: 20/50
OD_AXIS: 090
OS_SPHERE: +1.50
OD_SPHERE: +0.75
OS_CYLINDER: -2.25
OD_CYLINDER: -1.25
OS_VA1: 20/70

## 2023-09-12 ASSESSMENT — SPHEQUIV_DERIVED
OS_SPHEQUIV: 0.375
OD_SPHEQUIV: 0.125
OS_SPHEQUIV: 0.625
OD_SPHEQUIV: -0.375

## 2023-09-12 ASSESSMENT — KERATOMETRY
OS_K2POWER_DIOPTERS: 45.75
OD_K1POWER_DIOPTERS: 43.50
METHOD_AUTO_MANUAL: AUTO
OD_K2POWER_DIOPTERS: 46.00
OS_AXISANGLE_DEGREES: 004
OD_AXISANGLE_DEGREES: 001
OS_K1POWER_DIOPTERS: 43.00

## 2023-09-12 ASSESSMENT — VISUAL ACUITY
OD_BCVA: 20/80+1
OS_BCVA: 20/40

## 2023-09-12 ASSESSMENT — LID POSITION - PTOSIS
OD_PTOSIS: RUL 4+
OS_PTOSIS: LUL 4+

## 2023-09-12 ASSESSMENT — CONFRONTATIONAL VISUAL FIELD TEST (CVF)
OS_FINDINGS: FULL
OD_FINDINGS: FULL

## 2023-09-12 ASSESSMENT — REFRACTION_AUTOREFRACTION
OS_CYLINDER: -2.75
OD_CYLINDER: -0.25
OD_AXIS: 102
OD_SPHERE: -0.25
OS_AXIS: 092
OS_SPHERE: +2.00

## 2023-09-12 ASSESSMENT — AXIALLENGTH_DERIVED
OD_AL: 23.0948
OS_AL: 23.0402
OS_AL: 23.1335
OD_AL: 23.283

## 2023-09-12 ASSESSMENT — LID POSITION - COMMENTS
OS_COMMENTS: LEVATOR DEHISANCE.    MRD 1:  -1   IPD:  4   ULE:  >       BROW PTOSIS    MM,  PROMINENT FOREHEAD RHYTIDS, PROMINENT NASAL FAT PADS BOTH UPPER EYELIDS , NASAL FAT PADS MECHANICALLY WEIGHING DOWN UPPER EYELIDS, PROMINENT NASAL MIDDLE TEMPORAL FAT PADS BOTH
OD_COMMENTS: LEVATOR DEHISANCE.    MRD 1:  -1   IPD:  4   ULE:  >       BROW PTOSIS    MM,  PROMINENT FOREHEAD RHYTIDS, PROMINENT NASAL FAT PADS BOTH UPPER EYELIDS , NASAL FAT PADS MECHANICALLY WEIGHING DOWN UPPER EYELIDS, PROMINENT NASAL MIDDLE TEMPORAL FAT PADS BOTH

## 2023-09-28 ENCOUNTER — RX ONLY (RX ONLY)
Age: 88
End: 2023-09-28

## 2023-09-28 ENCOUNTER — OFFICE (OUTPATIENT)
Dept: URBAN - METROPOLITAN AREA CLINIC 115 | Facility: CLINIC | Age: 88
Setting detail: OPHTHALMOLOGY
End: 2023-09-28
Payer: MEDICAID

## 2023-09-28 DIAGNOSIS — H40.1132: ICD-10-CM

## 2023-09-28 DIAGNOSIS — Z96.1: ICD-10-CM

## 2023-09-28 PROCEDURE — 92133 CPTRZD OPH DX IMG PST SGM ON: CPT | Performed by: OPHTHALMOLOGY

## 2023-09-28 PROCEDURE — 92012 INTRM OPH EXAM EST PATIENT: CPT | Performed by: OPHTHALMOLOGY

## 2023-09-28 ASSESSMENT — REFRACTION_AUTOREFRACTION
OS_AXIS: 13
OD_SPHERE: -0.25
OS_CYLINDER: -3.01
OD_CYLINDER: -0.25
OD_AXIS: 006
OS_SPHERE: +3.00

## 2023-09-28 ASSESSMENT — CONFRONTATIONAL VISUAL FIELD TEST (CVF)
OD_FINDINGS: FULL
OS_FINDINGS: FULL

## 2023-09-28 ASSESSMENT — SPHEQUIV_DERIVED
OD_SPHEQUIV: 0.125
OS_SPHEQUIV: 0.375
OD_SPHEQUIV: -0.375
OS_SPHEQUIV: 1.495

## 2023-09-28 ASSESSMENT — REFRACTION_MANIFEST
OD_SPHERE: +0.75
OD_VA1: 20/50
OS_SPHERE: +1.50
OD_AXIS: 090
OD_CYLINDER: -1.25
OS_VA1: 20/70
OS_AXIS: 095
OS_CYLINDER: -2.25

## 2023-09-28 ASSESSMENT — VISUAL ACUITY
OD_BCVA: 20/50-1
OS_BCVA: 20/80

## 2023-09-28 ASSESSMENT — KERATOMETRY
METHOD_AUTO_MANUAL: AUTO
OS_K1POWER_DIOPTERS: 43.00
OS_AXISANGLE_DEGREES: 004
OD_AXISANGLE_DEGREES: 001
OS_K2POWER_DIOPTERS: 45.75
OD_K2POWER_DIOPTERS: 46.00
OD_K1POWER_DIOPTERS: 43.50

## 2023-09-28 ASSESSMENT — TONOMETRY
OD_IOP_MMHG: 12
OS_IOP_MMHG: 20

## 2023-09-28 ASSESSMENT — AXIALLENGTH_DERIVED
OD_AL: 23.0948
OS_AL: 23.1335
OD_AL: 23.283
OS_AL: 22.72

## 2023-09-28 ASSESSMENT — LID POSITION - PTOSIS
OS_PTOSIS: LUL 4+
OD_PTOSIS: RUL 4+

## 2023-10-12 ENCOUNTER — OFFICE (OUTPATIENT)
Dept: URBAN - METROPOLITAN AREA CLINIC 115 | Facility: CLINIC | Age: 88
Setting detail: OPHTHALMOLOGY
End: 2023-10-12
Payer: MEDICAID

## 2023-10-12 DIAGNOSIS — H35.3131: ICD-10-CM

## 2023-10-12 DIAGNOSIS — H35.373: ICD-10-CM

## 2023-10-12 DIAGNOSIS — H34.8310: ICD-10-CM

## 2023-10-12 PROCEDURE — 67028 INJECTION EYE DRUG: CPT | Mod: RT | Performed by: OPHTHALMOLOGY

## 2023-10-12 PROCEDURE — 92134 CPTRZ OPH DX IMG PST SGM RTA: CPT | Performed by: OPHTHALMOLOGY

## 2023-10-12 ASSESSMENT — REFRACTION_AUTOREFRACTION
OD_SPHERE: -0.25
OS_SPHERE: +3.00
OD_CYLINDER: -0.25
OS_CYLINDER: -3.01
OS_AXIS: 13
OD_AXIS: 006

## 2023-10-12 ASSESSMENT — KERATOMETRY
OD_K2POWER_DIOPTERS: 46.00
OD_K1POWER_DIOPTERS: 43.50
OD_AXISANGLE_DEGREES: 001
OS_K1POWER_DIOPTERS: 43.00
OS_AXISANGLE_DEGREES: 004
OS_K2POWER_DIOPTERS: 45.75
METHOD_AUTO_MANUAL: AUTO

## 2023-10-12 ASSESSMENT — VISUAL ACUITY
OS_BCVA: 20/60
OD_BCVA: 20/80

## 2023-10-12 ASSESSMENT — TONOMETRY
OD_IOP_MMHG: 10
OS_IOP_MMHG: 10

## 2023-10-12 ASSESSMENT — LID POSITION - PTOSIS
OS_PTOSIS: LUL 4+
OD_PTOSIS: RUL 4+

## 2023-10-12 ASSESSMENT — AXIALLENGTH_DERIVED
OD_AL: 23.283
OS_AL: 22.72

## 2023-10-12 ASSESSMENT — CONFRONTATIONAL VISUAL FIELD TEST (CVF)
OS_FINDINGS: FULL
OD_FINDINGS: FULL

## 2023-10-12 ASSESSMENT — SPHEQUIV_DERIVED
OD_SPHEQUIV: -0.375
OS_SPHEQUIV: 1.495

## 2023-11-14 ENCOUNTER — OFFICE (OUTPATIENT)
Dept: URBAN - METROPOLITAN AREA CLINIC 115 | Facility: CLINIC | Age: 88
Setting detail: OPHTHALMOLOGY
End: 2023-11-14
Payer: MEDICAID

## 2023-11-14 DIAGNOSIS — H35.373: ICD-10-CM

## 2023-11-14 DIAGNOSIS — H34.8310: ICD-10-CM

## 2023-11-14 PROCEDURE — 67028 INJECTION EYE DRUG: CPT | Mod: RT | Performed by: OPHTHALMOLOGY

## 2023-11-14 PROCEDURE — 92134 CPTRZ OPH DX IMG PST SGM RTA: CPT | Performed by: OPHTHALMOLOGY

## 2023-11-14 ASSESSMENT — REFRACTION_AUTOREFRACTION
OS_AXIS: 13
OS_CYLINDER: -3.01
OD_SPHERE: -0.25
OD_CYLINDER: -0.25
OD_AXIS: 006
OS_SPHERE: +3.00

## 2023-11-14 ASSESSMENT — LID POSITION - PTOSIS
OD_PTOSIS: RUL 4+
OS_PTOSIS: LUL 4+

## 2023-11-14 ASSESSMENT — CONFRONTATIONAL VISUAL FIELD TEST (CVF)
OS_FINDINGS: FULL
OD_FINDINGS: FULL

## 2023-11-14 ASSESSMENT — SPHEQUIV_DERIVED
OS_SPHEQUIV: 1.495
OD_SPHEQUIV: -0.375

## 2023-12-14 ENCOUNTER — OFFICE (OUTPATIENT)
Dept: URBAN - METROPOLITAN AREA CLINIC 115 | Facility: CLINIC | Age: 88
Setting detail: OPHTHALMOLOGY
End: 2023-12-14
Payer: MEDICAID

## 2023-12-14 DIAGNOSIS — H34.8310: ICD-10-CM

## 2023-12-14 DIAGNOSIS — H35.373: ICD-10-CM

## 2023-12-14 PROCEDURE — 67028 INJECTION EYE DRUG: CPT | Mod: RT | Performed by: OPHTHALMOLOGY

## 2023-12-14 PROCEDURE — 92134 CPTRZ OPH DX IMG PST SGM RTA: CPT | Performed by: OPHTHALMOLOGY

## 2023-12-14 ASSESSMENT — REFRACTION_AUTOREFRACTION
OS_SPHERE: +3.00
OD_SPHERE: -0.25
OD_CYLINDER: -0.25
OS_AXIS: 13
OD_AXIS: 006
OS_CYLINDER: -3.01

## 2023-12-14 ASSESSMENT — LID POSITION - PTOSIS
OS_PTOSIS: LUL 4+
OD_PTOSIS: RUL 4+

## 2023-12-14 ASSESSMENT — CONFRONTATIONAL VISUAL FIELD TEST (CVF)
OS_FINDINGS: FULL
OD_FINDINGS: FULL

## 2023-12-14 ASSESSMENT — SPHEQUIV_DERIVED
OD_SPHEQUIV: -0.375
OS_SPHEQUIV: 1.495

## 2023-12-15 NOTE — ED ADULT TRIAGE NOTE - MODE OF ARRIVAL
"  Assessment & Plan     High fever  -Symptoms for 4 days  -Clinically concerning for influenza  -Await labs  -Tylenol 1000 mg every 8 hours AND Ibuprofen 600 mg every 6-8 hours  OK to take them together.  - Symptomatic Influenza A/B, RSV, & SARS-CoV2 PCR (COVID-19)  - XR Chest 2 Views  -Hydration  -Await results of imaging  -Patient was interested in getting IV fluids, explained that we do not provide IV fluids here in the outpatient setting and if he is concerned about dehydration and would need to going to the emergency room  -Emergency room precautions reviewed in detail: Chest pain, sudden shortness of breath, dehydration, cyanosis    Malignant neoplasm of urinary bladder, unspecified site (H)  -Followed by urology  - XR Chest 2 Views      Ordering of each unique test  20 minutes spent by me on the date of the encounter doing chart review, history and exam, documentation and further activities per the note       BMI:   Estimated body mass index is 25.01 kg/m  as calculated from the following:    Height as of this encounter: 1.867 m (6' 1.5\").    Weight as of this encounter: 87.2 kg (192 lb 3.2 oz).       Johnna Faith MD MPH    [facility]    Nury Mcdaniels is a 53 year old, presenting for the following health issues:  Fever        12/15/2023     1:57 PM   Additional Questions   Roomed by karuna   Accompanied by spouse         12/15/2023     1:57 PM   Patient Reported Additional Medications   Patient reports taking the following new medications no     2 weeks ago felt achy for a couple of days and things improved  Sick contacts at work+  High fever 103.5 F last night, Tylenol and Ibuprofen    History of Present Illness       Headaches:   Since the patient's last clinic visit, headaches are: no change  The patient is getting headaches:  Just when im sick  He is able to do normal daily activities when he has a migraine.  The patient is taking the following rescue/relief medications:  " "Ibuprofen (Advil, Motrin)   Patient states \"I get only a small amount of relief\" from the rescue/relief medications.   The patient is taking the following medications to prevent migraines:  No medications to prevent migraines  In the past 4 weeks, the patient has gone to an Urgent Care or Emergency Room 0 times times due to headaches.    He eats 2-3 servings of fruits and vegetables daily.He consumes 1 sweetened beverage(s) daily.He exercises with enough effort to increase his heart rate 10 to 19 minutes per day.  He exercises with enough effort to increase his heart rate 3 or less days per week.   He is taking medications regularly.     Acute Illness  Acute illness concerns: Fever  Onset/Duration: 4 days   Symptoms:  Fever: YES  Chills/Sweats: YES  Headache (location?): YES  Sinus Pressure: No  Conjunctivitis:  No  Ear Pain: no  Rhinorrhea: YES  Congestion: YES  Sore Throat: No  Cough: YES-productive of yellow sputum  Wheeze: No  Decreased Appetite: YES  Nausea: YES  Vomiting: YES once last night   Diarrhea: No  Dysuria/Freq.: No  Dysuria or Hematuria: No  Fatigue/Achiness: YES  Sick/Strep Exposure: No  Therapies tried and outcome: tylenol and ibuprofen, cold and flu    Super achy  Home Covid test negative once   No chest pain  No breathing difficulties  Chills+       Review of Systems   Constitutional:  Positive for fever.      Constitutional, HEENT, cardiovascular, pulmonary, gi and gu systems are negative, except as otherwise noted.      Objective    /71 (BP Location: Left arm, Patient Position: Sitting, Cuff Size: Adult Regular)   Pulse 99   Temp (!) 102  F (38.9  C) (Tympanic)   Resp 16   Ht 1.867 m (6' 1.5\")   Wt 87.2 kg (192 lb 3.2 oz)   SpO2 96%   BMI 25.01 kg/m    Body mass index is 25.01 kg/m .  Physical Exam   GENERAL APPEARANCE: healthy, alert and mild distress  EYES: Eyes grossly normal to inspection and conjunctivae and sclerae normal  HENT: nose and mouth without ulcers or lesions, no " pharyngeal exudates or pus points, no uvular deviation, intact TMs  NECK: no adenopathy and trachea midline and normal to palpation  RESP: lungs clear to auscultation - no rales, rhonchi or wheezes  CV: regular rates and rhythm, normal S1 S2, no S3 or S4 and no murmur, click or rub  ABDOMEN: soft, non-tender and no rebound or guarding   MS: extremities normal- no gross deformities noted   SKIN: no suspicious lesions or rashes  NEURO: Normal strength and tone, mentation intact and speech normal  PSYCH: mentation appears normal      No results found for this or any previous visit (from the past 24 hour(s)).             [employer]

## 2024-01-03 ENCOUNTER — OFFICE (OUTPATIENT)
Dept: URBAN - METROPOLITAN AREA CLINIC 115 | Facility: CLINIC | Age: 89
Setting detail: OPHTHALMOLOGY
End: 2024-01-03
Payer: MEDICAID

## 2024-01-03 DIAGNOSIS — H35.3131: ICD-10-CM

## 2024-01-03 DIAGNOSIS — H35.373: ICD-10-CM

## 2024-01-03 DIAGNOSIS — H40.1132: ICD-10-CM

## 2024-01-03 DIAGNOSIS — Z96.1: ICD-10-CM

## 2024-01-03 PROCEDURE — 92250 FUNDUS PHOTOGRAPHY W/I&R: CPT | Performed by: OPHTHALMOLOGY

## 2024-01-03 PROCEDURE — 99214 OFFICE O/P EST MOD 30 MIN: CPT | Performed by: OPHTHALMOLOGY

## 2024-01-03 ASSESSMENT — SPHEQUIV_DERIVED
OS_SPHEQUIV: 0.75
OD_SPHEQUIV: -0.75

## 2024-01-03 ASSESSMENT — REFRACTION_AUTOREFRACTION
OS_AXIS: 085
OD_SPHERE: -0.25
OS_SPHERE: +2.25
OD_AXIS: 177
OS_CYLINDER: -3.00
OD_CYLINDER: -1.00

## 2024-01-03 ASSESSMENT — CONFRONTATIONAL VISUAL FIELD TEST (CVF)
OS_FINDINGS: FULL
OD_FINDINGS: FULL

## 2024-01-03 ASSESSMENT — LID POSITION - PTOSIS
OD_PTOSIS: RUL 4+
OS_PTOSIS: LUL 4+

## 2024-01-15 ENCOUNTER — OFFICE (OUTPATIENT)
Dept: URBAN - METROPOLITAN AREA CLINIC 115 | Facility: CLINIC | Age: 89
Setting detail: OPHTHALMOLOGY
End: 2024-01-15
Payer: MEDICAID

## 2024-01-15 DIAGNOSIS — H34.8310: ICD-10-CM

## 2024-01-15 DIAGNOSIS — H35.373: ICD-10-CM

## 2024-01-15 PROCEDURE — 92134 CPTRZ OPH DX IMG PST SGM RTA: CPT | Performed by: OPHTHALMOLOGY

## 2024-01-15 PROCEDURE — 67028 INJECTION EYE DRUG: CPT | Mod: RT | Performed by: OPHTHALMOLOGY

## 2024-01-15 ASSESSMENT — SPHEQUIV_DERIVED
OD_SPHEQUIV: -0.75
OS_SPHEQUIV: 0.75

## 2024-01-15 ASSESSMENT — REFRACTION_AUTOREFRACTION
OD_SPHERE: -0.25
OD_AXIS: 177
OS_SPHERE: +2.25
OS_CYLINDER: -3.00
OS_AXIS: 085
OD_CYLINDER: -1.00

## 2024-01-15 ASSESSMENT — CONFRONTATIONAL VISUAL FIELD TEST (CVF)
OS_FINDINGS: FULL
OD_FINDINGS: FULL

## 2024-01-15 ASSESSMENT — LID POSITION - PTOSIS
OS_PTOSIS: LUL 4+
OD_PTOSIS: RUL 4+

## 2024-02-29 ENCOUNTER — OFFICE (OUTPATIENT)
Dept: URBAN - METROPOLITAN AREA CLINIC 115 | Facility: CLINIC | Age: 89
Setting detail: OPHTHALMOLOGY
End: 2024-02-29
Payer: MEDICAID

## 2024-02-29 DIAGNOSIS — H35.373: ICD-10-CM

## 2024-02-29 DIAGNOSIS — H35.3131: ICD-10-CM

## 2024-02-29 DIAGNOSIS — H34.8310: ICD-10-CM

## 2024-02-29 PROCEDURE — 92134 CPTRZ OPH DX IMG PST SGM RTA: CPT | Performed by: OPHTHALMOLOGY

## 2024-02-29 PROCEDURE — 67028 INJECTION EYE DRUG: CPT | Mod: RT | Performed by: OPHTHALMOLOGY

## 2024-02-29 ASSESSMENT — CONFRONTATIONAL VISUAL FIELD TEST (CVF)
OD_FINDINGS: FULL
OS_FINDINGS: FULL

## 2024-02-29 ASSESSMENT — LID POSITION - PTOSIS
OD_PTOSIS: RUL 4+
OS_PTOSIS: LUL 4+

## 2024-04-26 ENCOUNTER — APPOINTMENT (OUTPATIENT)
Dept: NEUROLOGY | Facility: CLINIC | Age: 89
End: 2024-04-26

## 2025-01-16 NOTE — ED ADULT NURSE REASSESSMENT NOTE - NS ED NURSE REASSESS COMMENT FT1
36.8
Pt reports some relief of lower abdominal discomfort after Marsh placement. Grandson gave him Miralax and patient has been having small soft BM.
Pt's Marsh switched to leg bag. Instructed on use. Discharge and follow up instructions reviewed with patient and Grandson.
36.7